# Patient Record
Sex: FEMALE | Race: BLACK OR AFRICAN AMERICAN | NOT HISPANIC OR LATINO | Employment: FULL TIME | URBAN - METROPOLITAN AREA
[De-identification: names, ages, dates, MRNs, and addresses within clinical notes are randomized per-mention and may not be internally consistent; named-entity substitution may affect disease eponyms.]

---

## 2021-03-01 PROBLEM — Z80.3 FAMILY HISTORY OF MALIGNANT NEOPLASM OF BREAST: Status: ACTIVE | Noted: 2021-03-01

## 2021-03-01 PROBLEM — Z91.89 INCREASED RISK OF BREAST CANCER: Status: ACTIVE | Noted: 2021-03-01

## 2021-03-01 PROBLEM — N63.25 MASS OVERLAPPING MULTIPLE QUADRANTS OF LEFT BREAST: Status: ACTIVE | Noted: 2021-03-01

## 2021-03-01 PROBLEM — N63.15 MASS OVERLAPPING MULTIPLE QUADRANTS OF RIGHT BREAST: Status: ACTIVE | Noted: 2021-03-01

## 2021-03-01 PROBLEM — Z12.31 VISIT FOR SCREENING MAMMOGRAM: Status: ACTIVE | Noted: 2021-03-01

## 2022-04-21 LAB — BCS RECOMMENDATION EXT: NORMAL

## 2022-08-04 ENCOUNTER — TELEPHONE (OUTPATIENT)
Dept: FAMILY MEDICINE | Facility: CLINIC | Age: 67
End: 2022-08-04
Payer: COMMERCIAL

## 2022-08-04 NOTE — TELEPHONE ENCOUNTER
Dr patton asked that we sched pt and appt(can be NP or EP) to est w/ him. Can use 7 day or 1 day change. Held 7:40am on 8/23/22. Tried to reach pt to offer this appt. Voicemail not set up. Unable to leave msg.

## 2022-08-09 ENCOUNTER — TELEPHONE (OUTPATIENT)
Dept: FAMILY MEDICINE | Facility: CLINIC | Age: 67
End: 2022-08-09
Payer: COMMERCIAL

## 2022-08-09 NOTE — TELEPHONE ENCOUNTER
We have pt scheduled w/ Dr Lorenzo for 7:40am on 8/23/22, per Dr Fuentes and  Maura's request. I haven't been able to reach pt to let her know about the appt. Pt has appt tomorrow w/ Dr Fuentes, will you please let her know about her upcoming appt w/ Dr Lorenzo and to call us if she has any questions or conerns?

## 2022-08-10 ENCOUNTER — TELEPHONE (OUTPATIENT)
Dept: INTERNAL MEDICINE | Facility: CLINIC | Age: 67
End: 2022-08-10

## 2022-08-10 ENCOUNTER — OFFICE VISIT (OUTPATIENT)
Dept: INTERNAL MEDICINE | Facility: CLINIC | Age: 67
End: 2022-08-10
Payer: COMMERCIAL

## 2022-08-10 VITALS
TEMPERATURE: 99 F | OXYGEN SATURATION: 100 % | BODY MASS INDEX: 24.17 KG/M2 | SYSTOLIC BLOOD PRESSURE: 120 MMHG | HEIGHT: 64 IN | HEART RATE: 84 BPM | WEIGHT: 141.56 LBS | DIASTOLIC BLOOD PRESSURE: 82 MMHG

## 2022-08-10 DIAGNOSIS — H40.9 GLAUCOMA OF BOTH EYES, UNSPECIFIED GLAUCOMA TYPE: ICD-10-CM

## 2022-08-10 DIAGNOSIS — K27.9 PEPTIC ULCER: ICD-10-CM

## 2022-08-10 DIAGNOSIS — Z78.0 ASYMPTOMATIC POSTMENOPAUSAL STATE: ICD-10-CM

## 2022-08-10 DIAGNOSIS — Z12.31 ENCOUNTER FOR SCREENING MAMMOGRAM FOR MALIGNANT NEOPLASM OF BREAST: ICD-10-CM

## 2022-08-10 DIAGNOSIS — I10 ESSENTIAL HYPERTENSION: Primary | ICD-10-CM

## 2022-08-10 DIAGNOSIS — Z11.59 SCREENING FOR VIRAL DISEASE: ICD-10-CM

## 2022-08-10 DIAGNOSIS — Z80.3 FAMILY HISTORY OF MALIGNANT NEOPLASM OF BREAST: ICD-10-CM

## 2022-08-10 DIAGNOSIS — R70.0 ELEVATED SED RATE: ICD-10-CM

## 2022-08-10 DIAGNOSIS — D72.819 CHRONIC LEUKOPENIA: ICD-10-CM

## 2022-08-10 DIAGNOSIS — R09.82 POSTNASAL DRIP: ICD-10-CM

## 2022-08-10 PROBLEM — Z91.89 INCREASED RISK OF BREAST CANCER: Status: RESOLVED | Noted: 2021-03-01 | Resolved: 2022-08-10

## 2022-08-10 PROBLEM — N63.25 MASS OVERLAPPING MULTIPLE QUADRANTS OF LEFT BREAST: Status: RESOLVED | Noted: 2021-03-01 | Resolved: 2022-08-10

## 2022-08-10 PROCEDURE — 99204 OFFICE O/P NEW MOD 45 MIN: CPT | Mod: S$GLB,,, | Performed by: INTERNAL MEDICINE

## 2022-08-10 PROCEDURE — 4010F PR ACE/ARB THEARPY RXD/TAKEN: ICD-10-PCS | Mod: CPTII,S$GLB,, | Performed by: INTERNAL MEDICINE

## 2022-08-10 PROCEDURE — 3008F BODY MASS INDEX DOCD: CPT | Mod: CPTII,S$GLB,, | Performed by: INTERNAL MEDICINE

## 2022-08-10 PROCEDURE — 3074F SYST BP LT 130 MM HG: CPT | Mod: CPTII,S$GLB,, | Performed by: INTERNAL MEDICINE

## 2022-08-10 PROCEDURE — 3079F PR MOST RECENT DIASTOLIC BLOOD PRESSURE 80-89 MM HG: ICD-10-PCS | Mod: CPTII,S$GLB,, | Performed by: INTERNAL MEDICINE

## 2022-08-10 PROCEDURE — 99999 PR PBB SHADOW E&M-EST. PATIENT-LVL IV: CPT | Mod: PBBFAC,,, | Performed by: INTERNAL MEDICINE

## 2022-08-10 PROCEDURE — 99999 PR PBB SHADOW E&M-EST. PATIENT-LVL IV: ICD-10-PCS | Mod: PBBFAC,,, | Performed by: INTERNAL MEDICINE

## 2022-08-10 PROCEDURE — 3008F PR BODY MASS INDEX (BMI) DOCUMENTED: ICD-10-PCS | Mod: CPTII,S$GLB,, | Performed by: INTERNAL MEDICINE

## 2022-08-10 PROCEDURE — 3074F PR MOST RECENT SYSTOLIC BLOOD PRESSURE < 130 MM HG: ICD-10-PCS | Mod: CPTII,S$GLB,, | Performed by: INTERNAL MEDICINE

## 2022-08-10 PROCEDURE — 3079F DIAST BP 80-89 MM HG: CPT | Mod: CPTII,S$GLB,, | Performed by: INTERNAL MEDICINE

## 2022-08-10 PROCEDURE — 99204 PR OFFICE/OUTPT VISIT, NEW, LEVL IV, 45-59 MIN: ICD-10-PCS | Mod: S$GLB,,, | Performed by: INTERNAL MEDICINE

## 2022-08-10 PROCEDURE — 1126F PR PAIN SEVERITY QUANTIFIED, NO PAIN PRESENT: ICD-10-PCS | Mod: CPTII,S$GLB,, | Performed by: INTERNAL MEDICINE

## 2022-08-10 PROCEDURE — 4010F ACE/ARB THERAPY RXD/TAKEN: CPT | Mod: CPTII,S$GLB,, | Performed by: INTERNAL MEDICINE

## 2022-08-10 PROCEDURE — 1126F AMNT PAIN NOTED NONE PRSNT: CPT | Mod: CPTII,S$GLB,, | Performed by: INTERNAL MEDICINE

## 2022-08-10 PROCEDURE — 1159F PR MEDICATION LIST DOCUMENTED IN MEDICAL RECORD: ICD-10-PCS | Mod: CPTII,S$GLB,, | Performed by: INTERNAL MEDICINE

## 2022-08-10 PROCEDURE — 1159F MED LIST DOCD IN RCRD: CPT | Mod: CPTII,S$GLB,, | Performed by: INTERNAL MEDICINE

## 2022-08-10 RX ORDER — HYDROCHLOROTHIAZIDE 12.5 MG/1
12.5 CAPSULE ORAL EVERY MORNING
COMMUNITY
Start: 2022-08-04 | End: 2023-08-14

## 2022-08-10 RX ORDER — AMLODIPINE BESYLATE 10 MG/1
10 TABLET ORAL DAILY
Qty: 90 TABLET | Refills: 3 | Status: SHIPPED | OUTPATIENT
Start: 2022-08-10

## 2022-08-10 NOTE — ASSESSMENT & PLAN NOTE
BP elevated at home on Norvasc 10 mg daily, started on HCTZ 12.5 mg daily one week ago.  Watches sodium and walks a lot.  Clean [facility] in 2016, mild diastolic dysfunction.

## 2022-08-10 NOTE — ASSESSMENT & PLAN NOTE
WBC 2 dating back to 2016, stable on 7/2022 labs.  ESR elevated at that time.  Seen by hematology Q6 months.

## 2022-08-10 NOTE — TELEPHONE ENCOUNTER
----- Message from Anali Fuentes MD sent at 8/10/2022  1:38 PM CDT -----  Regarding: HM  Please get c-scope from Dr. Boni Bentley in [place].

## 2022-08-10 NOTE — PROGRESS NOTES
[facility] Note    Chief Complaint      Chief Complaint   Patient presents with    Establish Care     History of Present Illness      Rakesh Elizondo is a 67 y.o. female who presents today for HTN and to establish care.  Patient comes to appointment alone.    Having PND for last 2 weeks, had been taking Claritin daily for last 2 months.    Took Zyrtec in past.  Problem List Items Addressed This Visit     Essential hypertension - Primary    Current Assessment & Plan     BP elevated at home on Norvasc 10 mg daily, started on HCTZ 12.5 mg daily one week ago.  Watches sodium and walks a lot.  Clean [facility] in 2016, mild diastolic dysfunction.           Relevant Medications    amLODIPine (NORVASC) 10 MG tablet    Other Relevant Orders    CBC Auto Differential    Hemoglobin A1C    Family history of malignant neoplasm of breast    Current Assessment & Plan     Genetic testing done 3/2021 was negative, not UTD on MMG.           Chronic leukopenia    Current Assessment & Plan     WBC 2 dating back to 2016, stable on 7/2022 labs.  ESR elevated at that time.  Seen by hematology Q6 months.           Relevant Orders    Sedimentation rate    CBC Auto Differential    Peptic ulcer    Current Assessment & Plan     Seen on early 2022 EGD with Sheree.  Was on sucralfate.  No BRBPR.  No abd pain/N/V/belching/heartburn.           Glaucoma of both eyes    Current Assessment & Plan     Eye doctor moved, needs to find another doctor for eyes, on pred forte gtt.             Other Visit Diagnoses     Postnasal drip        Elevated sed rate        Screening for viral disease        Relevant Orders    Hepatitis C Antibody    Encounter for screening mammogram for malignant neoplasm of breast        Asymptomatic postmenopausal state              Health Maintenance   Topic Date Due    Hepatitis C Screening  Never done    TETANUS VACCINE  Never done    DEXA Scan  Never done    Mammogram  09/15/2018    Lipid Panel   "07/18/2027       Past Medical History:   Diagnosis Date    Essential hypertension 5/27/2016       History reviewed. No pertinent surgical history.    family history includes Brain cancer in her paternal aunt; Heart attack in her brother, father, and mother.    Social History     Tobacco Use    Smoking status: Never Smoker    Smokeless tobacco: Never Used   Substance Use Topics    Alcohol use: No    Drug use: No       Review of Systems   Constitutional: Negative for chills and fever.   HENT: Negative for sore throat.    Respiratory: Negative for cough and shortness of breath.    Cardiovascular: Negative for chest pain and palpitations.   Gastrointestinal: Negative for constipation, diarrhea, nausea and vomiting.   Genitourinary: Negative for dysuria and hematuria.   Musculoskeletal: Negative for falls.   Neurological: Negative for headaches.        Outpatient Encounter Medications as of 8/10/2022   Medication Sig Dispense Refill    hydroCHLOROthiazide (MICROZIDE) 12.5 mg capsule Take 12.5 mg by mouth every morning.      [DISCONTINUED] amLODIPine (NORVASC) 10 MG tablet Take 10 mg by mouth once daily.      amLODIPine (NORVASC) 10 MG tablet Take 1 tablet (10 mg total) by mouth once daily. 90 tablet 3    meloxicam (MOBIC) 15 MG tablet Take 15 mg by mouth once daily.      ofloxacin (OCUFLOX) 0.3 % ophthalmic solution PLACE 1 DROP IN THE LEFT EYE FOUR TIMES DAILY      prednisoLONE acetate (PRED FORTE) 1 % DrpS PLACE 1 DROP IN THE LEFT EYE FOUR TIMES DAILY      [DISCONTINUED] amlodipine (NORVASC) 5 MG tablet TAKE 1 TABLET BY MOUTH DAILY (Patient not taking: Reported on 8/10/2022) 30 tablet 0     No facility-administered encounter medications on file as of 8/10/2022.        Review of patient's allergies indicates:  No Known Allergies    Physical Exam      Vital Signs  Temp: 98.5 °F (36.9 °C)  Pulse: 84  SpO2: 100 %  BP: 120/82  Pain Score: 0-No pain  Height and Weight  Height: 5' 4" (162.6 cm)  Weight: 64.2 kg " (141 lb 8.6 oz)  BSA (Calculated - sq m): 1.7 sq meters  BMI (Calculated): 24.3  Weight in (lb) to have BMI = 25: 145.3]    Physical Exam  Constitutional:       Appearance: She is well-developed.   HENT:      Head: Normocephalic and atraumatic.      Right Ear: External ear normal.      Left Ear: External ear normal.   Eyes:      General:         Right eye: No discharge.         Left eye: No discharge.   Cardiovascular:      Rate and Rhythm: Normal rate and regular rhythm.      Heart sounds: Normal heart sounds. No murmur heard.  Pulmonary:      Effort: Pulmonary effort is normal. No respiratory distress.      Breath sounds: Normal breath sounds.   Abdominal:      General: There is no distension.      Palpations: Abdomen is soft.      Tenderness: There is no abdominal tenderness. There is no guarding.   Musculoskeletal:         General: Normal range of motion.      Cervical back: Normal range of motion.   Skin:     General: Skin is warm and dry.   Neurological:      Mental Status: She is alert and oriented to person, place, and time.   Psychiatric:         Behavior: Behavior normal.          Laboratory:  CBC:  Recent Labs   Lab Result Units 07/18/22  1137   WBC K/uL 1.99*   RBC M/uL 4.13   Hemoglobin g/dL 12.7   Hematocrit % 37.7   Platelets K/uL 346   MCV fL 91   MCH pg 30.8   MCHC g/dL 33.7     CMP:  Recent Labs   Lab Result Units 07/18/22  1137   Glucose mg/dL 86  86   Calcium mg/dL 9.2  9.2   Albumin g/dL 4.3  4.3   Total Protein g/dL 7.7  7.7   Sodium mmol/L 139  139   Potassium mmol/L 3.7  3.7   CO2 mmol/L 28  28   Chloride mmol/L 104  104   BUN mg/dL 11  11   Alkaline Phosphatase U/L 112  112   ALT U/L 19  19   AST U/L 31  31   Total Bilirubin mg/dL 0.6  0.6     URINALYSIS:  No results for input(s): COLORU, CLARITYU, SPECGRAV, PHUR, PROTEINUA, GLUCOSEU, BILIRUBINCON, BLOODU, WBCU, RBCU, BACTERIA, MUCUS, NITRITE, LEUKOCYTESUR, UROBILINOGEN, HYALINECASTS in the last 2160 hours.   LIPIDS:  Recent  Labs   Lab Result Units 07/18/22  1137   TSH uIU/mL 0.674   HDL mg/dL 89*   Cholesterol mg/dL 179   Triglycerides mg/dL 56   LDL Cholesterol mg/dL 78.8   HDL/Cholesterol Ratio % 49.7   Non-HDL Cholesterol mg/dL 90   Total Cholesterol/HDL Ratio  2.0     TSH:  Recent Labs   Lab Result Units 07/18/22  1137   TSH uIU/mL 0.674     A1C:  No results for input(s): HGBA1C in the last 2160 hours.    Radiology:  No results found in the last 30 days.     Assessment/Plan     Rakesh Elizondo is a 67 y.o.female with:    1. Essential hypertension  - CBC Auto Differential; Future  - Hemoglobin A1C; Future  - amLODIPine (NORVASC) 10 MG tablet; Take 1 tablet (10 mg total) by mouth once daily.  Dispense: 90 tablet; Refill: 3    2. Postnasal drip    3. Family history of malignant neoplasm of breast    4. Chronic leukopenia  - Sedimentation rate; Future  - CBC Auto Differential; Future    5. Elevated sed rate    6. Screening for viral disease  - Hepatitis C Antibody; Future    7. Encounter for screening mammogram for malignant neoplasm of breast    8. Asymptomatic postmenopausal state    9. Peptic ulcer    10. Glaucoma of both eyes, unspecified glaucoma type    -recheck CBC and ESR, check A1C and screen for hep C  -get MMG from [place], do DEXA with MMG next year  -counseled on COVID booster  -Continue current medications and maintain follow up with specialists.    -Follow up in about 6 months (around 2/10/2023) for follow up of medical problems.       Anali Fuentes MD  [facility]

## 2022-08-10 NOTE — PATIENT INSTRUCTIONS
Stop Claritin, start Allegra or Xyzal.  Generic is fine.    Start Flonase (fluticasone) once daily.     Get your second COVID booster shot ASAP.    Check with your pharmacist about getting Shingrix (shingles vaccine).

## 2022-08-10 NOTE — TELEPHONE ENCOUNTER
----- Message from Anali Fuentes MD sent at 8/10/2022  1:27 PM CDT -----  Regarding: HM  Please get MMG from [place] in [place].

## 2022-08-10 NOTE — ASSESSMENT & PLAN NOTE
Seen on early 2022 EGD with Sheree.  Was on sucralfate.  No BRBPR.  No abd pain/N/V/belching/heartburn.

## 2022-08-10 NOTE — LETTER
AUTHORIZATION FOR RELEASE OF   CONFIDENTIAL INFORMATION    Dear [facility],    We are seeing Rakesh Elizondo, date of birth 1955, in the clinic at [facility]. Anali Fuentes MD is the patient's PCP. Rakesh Elizondo has an outstanding lab/procedure at the time we reviewed her chart. In order to help keep her health information updated, she has authorized us to request the following medical record(s):        ( x )  MAMMOGRAM                                      (  )  COLONOSCOPY      (  )  PAP SMEAR                                          (  )  OUTSIDE LAB RESULTS     (  )  DEXA SCAN                                          (  )  EYE EXAM            (  )  FOOT EXAM                                          (  )  ENTIRE RECORD     (  )  OUTSIDE IMMUNIZATIONS                 (  )  _______________         Please fax records to [facility], Jenna C Jordan, MD, [phone]     If you have any questions, please contact [place] at [phone].           Patient Name: Rakesh Elizondo  : 1955  Patient Phone #: [phone]

## 2022-08-10 NOTE — LETTER
AUTHORIZATION FOR RELEASE OF   CONFIDENTIAL INFORMATION    Dear Dr Boni Bentley,    We are seeing Rakesh Elizondo, date of birth 1955, in the clinic at [facility]. Anali Fuentes MD is the patient's PCP. Rakesh Elizondo has an outstanding lab/procedure at the time we reviewed her chart. In order to help keep her health information updated, she has authorized us to request the following medical record(s):        (  )  MAMMOGRAM                                      ( x )  COLONOSCOPY      (  )  PAP SMEAR                                          (  )  OUTSIDE LAB RESULTS     (  )  DEXA SCAN                                          (  )  EYE EXAM            (  )  FOOT EXAM                                          (  )  ENTIRE RECORD     (  )  OUTSIDE IMMUNIZATIONS                 (  )  _______________         Please fax records to [facility], Jenna C Jordan, MD, [phone]     If you have any questions, please contact [place] at [phone].           Patient Name: Rakesh Elizondo  : 1955  Patient Phone #: [phone]

## 2022-08-15 ENCOUNTER — PATIENT OUTREACH (OUTPATIENT)
Dept: ADMINISTRATIVE | Facility: HOSPITAL | Age: 67
End: 2022-08-15
Payer: COMMERCIAL

## 2022-08-15 NOTE — PROGRESS NOTES
Health Maintenance Due   Topic Date Due    Hepatitis C Screening  Never done    TETANUS VACCINE  Never done    DEXA Scan  Never done    Shingles Vaccine (2 of 3) 11/14/2015    Pneumococcal Vaccines (Age 65+) (1 - PCV) Never done    COVID-19 Vaccine (4 - Booster for Pfizer series) 02/28/2022     Chart review done. HM updated. Immunizations reviewed & updated. Care Everywhere updated.  Mammogram for 4/21/22 Scanned into chart.

## 2022-08-31 DIAGNOSIS — Z78.0 MENOPAUSE: ICD-10-CM

## 2023-08-12 DIAGNOSIS — Z11.59 SCREENING FOR VIRAL DISEASE: ICD-10-CM

## 2023-08-12 DIAGNOSIS — Z00.00 ANNUAL PHYSICAL EXAM: Primary | ICD-10-CM

## 2023-08-12 NOTE — TELEPHONE ENCOUNTER
Care Due:                  Date            Visit Type   Department     Provider  --------------------------------------------------------------------------------                                NP -                              PRIMARY      [facility]  Last Visit: 08-      CARE ([facility])   PADMA Fuentes  Next Visit: None Scheduled  None         None Found                                                            Last  Test          Frequency    Reason                     Performed    Due Date  --------------------------------------------------------------------------------    Office Visit  12 months..  amLODIPine...............  08-   08-    [facility] Embedded Care Due Messages. Reference number: [id number].   8/12/2023 4:23:59 PM CDT

## 2023-08-12 NOTE — TELEPHONE ENCOUNTER
Refill Routing Note   Medication(s) are not appropriate for processing by [facility] for the following reason(s):      Required labs outdated  Required vitals outdated  No active prescription written by provider    [facility] action(s):  Defer Care Due:  Appointment due            Appointments  past 12m or future 3m with PCP    Date Provider   Last Visit   8/10/2022 Anali Fuentes MD   Next Visit   Visit date not found Anali Fuentes MD   ED visits in past 90 days: 0        Note composed:4:33 PM 08/12/2023

## 2023-08-14 ENCOUNTER — TELEPHONE (OUTPATIENT)
Dept: PRIMARY CARE CLINIC | Facility: CLINIC | Age: 68
End: 2023-08-14
Payer: COMMERCIAL

## 2023-08-14 RX ORDER — HYDROCHLOROTHIAZIDE 12.5 MG/1
CAPSULE ORAL
Qty: 90 CAPSULE | Refills: 3 | Status: SHIPPED | OUTPATIENT
Start: 2023-08-14